# Patient Record
Sex: FEMALE | Race: WHITE
[De-identification: names, ages, dates, MRNs, and addresses within clinical notes are randomized per-mention and may not be internally consistent; named-entity substitution may affect disease eponyms.]

---

## 2021-03-31 ENCOUNTER — HOSPITAL ENCOUNTER (EMERGENCY)
Dept: HOSPITAL 53 - M ED | Age: 21
Discharge: HOME | End: 2021-03-31
Payer: COMMERCIAL

## 2021-03-31 VITALS — SYSTOLIC BLOOD PRESSURE: 127 MMHG | DIASTOLIC BLOOD PRESSURE: 68 MMHG

## 2021-03-31 VITALS — WEIGHT: 181.22 LBS | BODY MASS INDEX: 28.44 KG/M2 | HEIGHT: 67 IN

## 2021-03-31 DIAGNOSIS — M54.9: ICD-10-CM
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
## 2021-03-31 DIAGNOSIS — S06.0X0A: ICD-10-CM
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
## 2021-03-31 DIAGNOSIS — S46.911A: ICD-10-CM

## 2021-03-31 DIAGNOSIS — S16.1XXA: Primary | ICD-10-CM
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
